# Patient Record
Sex: MALE | Race: BLACK OR AFRICAN AMERICAN | NOT HISPANIC OR LATINO
[De-identification: names, ages, dates, MRNs, and addresses within clinical notes are randomized per-mention and may not be internally consistent; named-entity substitution may affect disease eponyms.]

---

## 2018-04-19 ENCOUNTER — APPOINTMENT (OUTPATIENT)
Dept: ORTHOPEDIC SURGERY | Facility: CLINIC | Age: 63
End: 2018-04-19
Payer: COMMERCIAL

## 2018-04-19 DIAGNOSIS — M75.41 IMPINGEMENT SYNDROME OF RIGHT SHOULDER: ICD-10-CM

## 2018-04-19 PROCEDURE — 99214 OFFICE O/P EST MOD 30 MIN: CPT | Mod: 25

## 2018-04-19 PROCEDURE — 73030 X-RAY EXAM OF SHOULDER: CPT | Mod: RT

## 2018-04-19 PROCEDURE — 20610 DRAIN/INJ JOINT/BURSA W/O US: CPT | Mod: RT

## 2019-01-03 ENCOUNTER — APPOINTMENT (OUTPATIENT)
Dept: ORTHOPEDIC SURGERY | Facility: CLINIC | Age: 64
End: 2019-01-03

## 2019-01-17 ENCOUNTER — APPOINTMENT (OUTPATIENT)
Dept: ORTHOPEDIC SURGERY | Facility: CLINIC | Age: 64
End: 2019-01-17
Payer: COMMERCIAL

## 2019-01-17 PROCEDURE — 73564 X-RAY EXAM KNEE 4 OR MORE: CPT | Mod: RT

## 2019-01-17 PROCEDURE — 99214 OFFICE O/P EST MOD 30 MIN: CPT | Mod: 25

## 2019-01-17 PROCEDURE — 20610 DRAIN/INJ JOINT/BURSA W/O US: CPT | Mod: RT

## 2019-01-17 NOTE — PHYSICAL EXAM
[de-identified] : Oriented to time, place, person\par Mood: Normal\par Affect: Normal\par \par Right knee exam\par \par Skin: Clean, dry, intact\par Inspection: No obvious malalignment, no masses, mild swelling, no effusion\par Pulses: 2+ DP/PT pulses\par ROM: 0-120 degrees of flexion. Anterior pain with deep knee flexion/extension.\par Tenderness: No MJLT. No LJLT. Positive pain over the patella facets. No pain to the quadriceps tendon. Mild pain to the patella tendon. No posterior knee tenderness.\par Stability: Stable to varus, valgus. Negative lachman testing. Negative anterior drawer, negative posterior drawer.\par Strength: 5/5 Q/H/TA/GS/EHL, without atrophy\par Neuro: In tact to light touch throughout, DTR's normal\par Additional tests: Negative McMurrays test, mild patellar grind test. \par \par  [de-identified] : \par The following radiographs were ordered and read by me during this patients visit. I reviewed each radiograph in detail with the patient and discussed the findings as highlighted below. \par \par 4 views of the right knee were obtained today that show no acute fracture or dislocation. There is moderate medial, moderate lateral and moderate patellofemoral degenerative change seen. There is no significant malalignment. No significant other obvious osseous abnormality, otherwise unremarkable. Patella robert. Patella tendinopathy.

## 2019-01-17 NOTE — DISCUSSION/SUMMARY
[de-identified] : 63-year-old male with right knee osteoarthritis\par \par Patient presents today for evaluation right knee pain following remote patella tendon repair. Patient has some patella tendinopathy, with residual arthrosis within all compartments. Recent exacerbation with mild injury.\par \par I discussed the treatment of degenerative arthritis with the patient at length today, as well as the chronic degenerative process and likely progression of the disease. I described the spectrum of treatment from nonoperative modalities to total joint arthroplasty. Noninvasive and nonoperative treatment modalities include weight reduction, activity modification with low impact exercise, PRN use of acetaminophen or anti-inflammatory medication if tolerated, glucosamine/chondroitin supplements, and physical therapy. Further treatments can include corticosteroid injection and the use of hyaluronic acid injections. Definitive treatment would include total joint arthroplasty. \par \par The risks and benefits of each treatment options was discussed and all questions were answered.\par \par Current recommendations: Cortisone injection provided today for symptomatic relief. Conservative/symptomatic supportive care as above\par \par Followup p.r.n.

## 2019-01-17 NOTE — HISTORY OF PRESENT ILLNESS
[de-identified] : 63 year old male presents today with right knee pain since October 2018. Pain started after he missed a step on a ladder in October. Remote history of bilateral patella tendon repairs. The pain is intermittent brought pain with driving. Motrin is mildly helpful. Pain is localized to the medial aspect of the knee. Reports stiffness in the knees.

## 2019-01-17 NOTE — PROCEDURE
[de-identified] : Injection: Right knee joint.\par Indication: Osteoarthritis.\par \par A discussion was had with the patient regarding this procedure and all questions were answered. All risks, benefits and alternatives were discussed. These included but were not limited to bleeding, infection, and allergic reaction. Alcohol was used to clean the skin, and betadine was used to sterilize and prep the area in the supero-lateral aspect of the right knee. Ethyl chloride spray was then used as a topical anesthetic. A 21-gauge needle was used to inject 4cc of 1% lidocaine and 1cc of 40mg/ml methylprednisolone into the knee. A sterile bandage was then applied. The patient tolerated the procedure well and there were no complications.

## 2019-11-09 ENCOUNTER — INPATIENT (INPATIENT)
Facility: HOSPITAL | Age: 64
LOS: 2 days | Discharge: HOME | DRG: 62 | End: 2019-11-12
Attending: INTERNAL MEDICINE | Admitting: PSYCHIATRY & NEUROLOGY
Payer: COMMERCIAL

## 2019-11-09 VITALS — HEART RATE: 30 BPM | RESPIRATION RATE: 14 BRPM

## 2019-11-09 DIAGNOSIS — Z98.89 OTHER SPECIFIED POSTPROCEDURAL STATES: Chronic | ICD-10-CM

## 2019-11-09 DIAGNOSIS — I63.9 CEREBRAL INFARCTION, UNSPECIFIED: ICD-10-CM

## 2019-11-09 LAB
ALBUMIN SERPL ELPH-MCNC: 4.6 G/DL — SIGNIFICANT CHANGE UP (ref 3.3–5)
ALP SERPL-CCNC: 64 U/L — SIGNIFICANT CHANGE UP (ref 40–120)
ALT FLD-CCNC: 13 U/L — SIGNIFICANT CHANGE UP (ref 10–45)
ANION GAP SERPL CALC-SCNC: 14 MMOL/L — SIGNIFICANT CHANGE UP (ref 5–17)
APTT BLD: 28.1 SEC — SIGNIFICANT CHANGE UP (ref 27.5–36.3)
AST SERPL-CCNC: 20 U/L — SIGNIFICANT CHANGE UP (ref 10–40)
BASOPHILS # BLD AUTO: 0.02 K/UL — SIGNIFICANT CHANGE UP (ref 0–0.2)
BASOPHILS NFR BLD AUTO: 0.1 % — SIGNIFICANT CHANGE UP (ref 0–2)
BILIRUB SERPL-MCNC: 0.5 MG/DL — SIGNIFICANT CHANGE UP (ref 0.2–1.2)
BUN SERPL-MCNC: 13 MG/DL — SIGNIFICANT CHANGE UP (ref 7–23)
CALCIUM SERPL-MCNC: 9.1 MG/DL — SIGNIFICANT CHANGE UP (ref 8.4–10.5)
CHLORIDE SERPL-SCNC: 106 MMOL/L — SIGNIFICANT CHANGE UP (ref 96–108)
CK MB CFR SERPL CALC: 2.1 NG/ML — SIGNIFICANT CHANGE UP (ref 0–6.7)
CO2 SERPL-SCNC: 24 MMOL/L — SIGNIFICANT CHANGE UP (ref 22–31)
CREAT SERPL-MCNC: 1 MG/DL — SIGNIFICANT CHANGE UP (ref 0.5–1.3)
EOSINOPHIL # BLD AUTO: 0.17 K/UL — SIGNIFICANT CHANGE UP (ref 0–0.5)
EOSINOPHIL NFR BLD AUTO: 1.3 % — SIGNIFICANT CHANGE UP (ref 0–6)
GLUCOSE SERPL-MCNC: 172 MG/DL — HIGH (ref 70–99)
HCT VFR BLD CALC: 43.1 % — SIGNIFICANT CHANGE UP (ref 39–50)
HGB BLD-MCNC: 14.2 G/DL — SIGNIFICANT CHANGE UP (ref 13–17)
IMM GRANULOCYTES NFR BLD AUTO: 0.4 % — SIGNIFICANT CHANGE UP (ref 0–1.5)
INR BLD: 1.03 RATIO — SIGNIFICANT CHANGE UP (ref 0.88–1.16)
LYMPHOCYTES # BLD AUTO: 22.7 % — SIGNIFICANT CHANGE UP (ref 13–44)
LYMPHOCYTES # BLD AUTO: 3.08 K/UL — SIGNIFICANT CHANGE UP (ref 1–3.3)
MCHC RBC-ENTMCNC: 27 PG — SIGNIFICANT CHANGE UP (ref 27–34)
MCHC RBC-ENTMCNC: 32.9 GM/DL — SIGNIFICANT CHANGE UP (ref 32–36)
MCV RBC AUTO: 81.9 FL — SIGNIFICANT CHANGE UP (ref 80–100)
MONOCYTES # BLD AUTO: 0.7 K/UL — SIGNIFICANT CHANGE UP (ref 0–0.9)
MONOCYTES NFR BLD AUTO: 5.2 % — SIGNIFICANT CHANGE UP (ref 2–14)
NEUTROPHILS # BLD AUTO: 9.55 K/UL — HIGH (ref 1.8–7.4)
NEUTROPHILS NFR BLD AUTO: 70.3 % — SIGNIFICANT CHANGE UP (ref 43–77)
NRBC # BLD: 0 /100 WBCS — SIGNIFICANT CHANGE UP (ref 0–0)
PLATELET # BLD AUTO: 251 K/UL — SIGNIFICANT CHANGE UP (ref 150–400)
POTASSIUM SERPL-MCNC: 3.7 MMOL/L — SIGNIFICANT CHANGE UP (ref 3.5–5.3)
POTASSIUM SERPL-SCNC: 3.7 MMOL/L — SIGNIFICANT CHANGE UP (ref 3.5–5.3)
PROT SERPL-MCNC: 7.6 G/DL — SIGNIFICANT CHANGE UP (ref 6–8.3)
PROTHROM AB SERPL-ACNC: 11.8 SEC — SIGNIFICANT CHANGE UP (ref 10–12.9)
RBC # BLD: 5.26 M/UL — SIGNIFICANT CHANGE UP (ref 4.2–5.8)
RBC # FLD: 13.3 % — SIGNIFICANT CHANGE UP (ref 10.3–14.5)
SODIUM SERPL-SCNC: 144 MMOL/L — SIGNIFICANT CHANGE UP (ref 135–145)
TROPONIN T, HIGH SENSITIVITY RESULT: 6 NG/L — SIGNIFICANT CHANGE UP (ref 0–51)
WBC # BLD: 13.58 K/UL — HIGH (ref 3.8–10.5)
WBC # FLD AUTO: 13.58 K/UL — HIGH (ref 3.8–10.5)

## 2019-11-09 PROCEDURE — 0042T: CPT

## 2019-11-09 PROCEDURE — 93010 ELECTROCARDIOGRAM REPORT: CPT

## 2019-11-09 PROCEDURE — 99291 CRITICAL CARE FIRST HOUR: CPT

## 2019-11-09 PROCEDURE — 70496 CT ANGIOGRAPHY HEAD: CPT | Mod: 26

## 2019-11-09 PROCEDURE — 71045 X-RAY EXAM CHEST 1 VIEW: CPT | Mod: 26

## 2019-11-09 PROCEDURE — 70498 CT ANGIOGRAPHY NECK: CPT | Mod: 26

## 2019-11-09 RX ORDER — ALTEPLASE 100 MG
9 KIT INTRAVENOUS ONCE
Refills: 0 | Status: COMPLETED | OUTPATIENT
Start: 2019-11-09 | End: 2019-11-09

## 2019-11-09 RX ORDER — SODIUM CHLORIDE 9 MG/ML
1000 INJECTION INTRAMUSCULAR; INTRAVENOUS; SUBCUTANEOUS
Refills: 0 | Status: DISCONTINUED | OUTPATIENT
Start: 2019-11-09 | End: 2019-11-09

## 2019-11-09 RX ORDER — ALTEPLASE 100 MG
81 KIT INTRAVENOUS ONCE
Refills: 0 | Status: COMPLETED | OUTPATIENT
Start: 2019-11-09 | End: 2019-11-09

## 2019-11-09 RX ORDER — ACETAMINOPHEN 500 MG
650 TABLET ORAL EVERY 6 HOURS
Refills: 0 | Status: DISCONTINUED | OUTPATIENT
Start: 2019-11-09 | End: 2019-11-12

## 2019-11-09 RX ORDER — ATROPINE SULFATE 0.1 MG/ML
0.5 SYRINGE (ML) INJECTION ONCE
Refills: 0 | Status: DISCONTINUED | OUTPATIENT
Start: 2019-11-09 | End: 2019-11-12

## 2019-11-09 RX ORDER — ATORVASTATIN CALCIUM 80 MG/1
80 TABLET, FILM COATED ORAL AT BEDTIME
Refills: 0 | Status: DISCONTINUED | OUTPATIENT
Start: 2019-11-09 | End: 2019-11-12

## 2019-11-09 RX ORDER — ONDANSETRON 8 MG/1
4 TABLET, FILM COATED ORAL ONCE
Refills: 0 | Status: COMPLETED | OUTPATIENT
Start: 2019-11-09 | End: 2019-11-09

## 2019-11-09 RX ORDER — MECLIZINE HCL 12.5 MG
25 TABLET ORAL ONCE
Refills: 0 | Status: DISCONTINUED | OUTPATIENT
Start: 2019-11-09 | End: 2019-11-09

## 2019-11-09 RX ORDER — SODIUM CHLORIDE 9 MG/ML
1000 INJECTION INTRAMUSCULAR; INTRAVENOUS; SUBCUTANEOUS ONCE
Refills: 0 | Status: COMPLETED | OUTPATIENT
Start: 2019-11-09 | End: 2019-11-09

## 2019-11-09 RX ADMIN — ONDANSETRON 4 MILLIGRAM(S): 8 TABLET, FILM COATED ORAL at 11:33

## 2019-11-09 RX ADMIN — ALTEPLASE 540 MILLIGRAM(S): KIT at 12:19

## 2019-11-09 RX ADMIN — SODIUM CHLORIDE 1000 MILLILITER(S): 9 INJECTION INTRAMUSCULAR; INTRAVENOUS; SUBCUTANEOUS at 12:00

## 2019-11-09 RX ADMIN — ATORVASTATIN CALCIUM 80 MILLIGRAM(S): 80 TABLET, FILM COATED ORAL at 23:39

## 2019-11-09 RX ADMIN — ALTEPLASE 81 MILLIGRAM(S): KIT at 12:20

## 2019-11-09 RX ADMIN — Medication 650 MILLIGRAM(S): at 23:40

## 2019-11-09 NOTE — ED ADULT NURSE NOTE - CHPI ED NUR SYMPTOMS POS
Pain, swelling with abrasions to top of left foot, lower back pains with headache  s/p falling from top stairs since yesterday morning, today c/o feeling dizzy today.
DIZZINESS/NAUSEA

## 2019-11-09 NOTE — ED ADULT NURSE NOTE - NSIMPLEMENTINTERV_GEN_ALL_ED
Implemented All Fall with Harm Risk Interventions:  [place] to call system. Call bell, personal items and telephone within reach. Instruct patient to call for assistance. Room bathroom lighting operational. Non-slip footwear when patient is off stretcher. Physically safe environment: no spills, clutter or unnecessary equipment. Stretcher in lowest position, wheels locked, appropriate side rails in place. Provide visual cue, wrist band, yellow gown, etc. Monitor gait and stability. Monitor for mental status changes and reorient to person, place, and time. Review medications for side effects contributing to fall risk. Reinforce activity limits and safety measures with patient and family. Provide visual clues: red socks.

## 2019-11-09 NOTE — PHYSICAL THERAPY INITIAL EVALUATION ADULT - PRECAUTIONS/LIMITATIONS, REHAB EVAL
CT BRAIN:  No acute intracranial hemorrhage, mass effect, or CT evidence of an acute or recent subacute transcortical infarct.CTA NECK:  No significant stenosis of the cervical carotid or vertebral arteries.CTA HEAD:  No significant stenosis or occlusion of the major proximal branches of the [place].CT PERFUSION: No core infarct is identified. fall precautions/CT BRAIN:  No acute intracranial hemorrhage, mass effect, or CT evidence of an acute or recent subacute transcortical infarct.CTA NECK:  No significant stenosis of the cervical carotid or vertebral arteries.CTA HEAD:  No significant stenosis or occlusion of the major proximal branches of the [place].CT PERFUSION: No core infarct is identified.

## 2019-11-09 NOTE — OCCUPATIONAL THERAPY INITIAL EVALUATION ADULT - PERTINENT HX OF CURRENT PROBLEM, REHAB EVAL
63yo man no pmh not on AC presenting with sudden onset dizziness, nausea, vomiting came in as code stroke with LWK 11/9/19 9a when patient felt sudden onset unrelated to position dizziness and fell, not hitting his head nor losing consciousness. S/p tpa administered at 11:56 am on 11/9. SEE BELOW.

## 2019-11-09 NOTE — PHYSICAL THERAPY INITIAL EVALUATION ADULT - PREDICTED DURATION OF THERAPY (DAYS/WKS), PT EVAL
Pt is cleared from inpatient PT services at this time, ambulating independently please re-consult if appropriate

## 2019-11-09 NOTE — ED PROVIDER NOTE - CRANIAL NERVE AND PUPILLARY EXAM
has difficulty identifying objects in the right peripheral field/cranial nerves 2-12 intact/PERIPHERAL VISION NOT INTACT/extra-ocular movements intact/tongue is midline

## 2019-11-09 NOTE — ED PROVIDER NOTE - ATTENDING CONTRIBUTION TO CARE
****ATTENDING**** 65yo male hx bradycardia BIB EMS from  for dizziness and low HR. Pt states he was driving to the gym when he had sudden onset of dizziness, n/v. Patient drove to  and EKG revealed bradycardia. Pt denies HA, chest pain, palp, no recent illness and was in good health until symptoms. + dizziness w room spinning.   On exam, patient is awake and alert x3, +nauseous and vomiting. R sided nystagmus. PERRL, EOMI, VF intact, Patient's chest is clear to ausculation, +s1s2. Abdomen is soft nd/nt +BS. Extremity with no swelling or calf tenderness. 5/5 UE/LE nml sensation and coordination.  Code stroke called. EKG reviewed. Check labs, CT to eval for CVA.

## 2019-11-09 NOTE — CONSULT NOTE ADULT - ASSESSMENT
63yo man no pmh not on AC presenting with sudden onset dizziness, nausea, vomiting came in as code stroke with LWK 11/9/19 9a when patient felt sudden onset unrelated to position dizziness and fell, not hitting his head nor losing consciousness. (09 Nov 2019 14:05)  pt was found bradycardia at the time 39-45 bpm.  pt denies of any hx of dizziness/ syncopal episodes.  pt with acute cva  bradycardia with sig bifascicular block suspect conduction disease   observe closely on tele  tsh  will consider echo  no av blocking agents  increase bp will add ace inhibitor

## 2019-11-09 NOTE — ED ADULT NURSE NOTE - OBJECTIVE STATEMENT
64 year old male a/ox3 with no significant PMH presenting via EMS from [facility] for acute onset of dizziness and nausea while driving this morning. patient states he was on the way to gym this morning and suddenly became very dizzy. drove to urgent care, had EKG done which showed sinus bradycardia. patient was given 4mg Zofran Po and 911 called. upon arrival patient was very nauseous and dizzy. upon assessment patient noted to have bilateral horizontal nystagmus, worse on right side. code stroke initiated FS obtained and patient sent to CT scan where neurology resident arrived. TpA bolus given by toñito resident at 1219 and infusion initiated at 1220. 2 PIV placed prior to code stroke.

## 2019-11-09 NOTE — CONSULT NOTE ADULT - SUBJECTIVE AND OBJECTIVE BOX
CHIEF Complaint: patient is a 64y old  Male who presents with a chief complaint of s/p tpa (09 Nov 2019 14:05)      HPI:  65yo man no pmh not on AC presenting with sudden onset dizziness, nausea, vomiting came in as code stroke with LWK 11/9/19 9a when patient felt sudden onset unrelated to position dizziness and fell, not hitting his head nor losing consciousness. (09 Nov 2019 14:05)  pt was found bradycardia at the time 39-45 bpm.  pt denies of any hx of dizziness/ syncopal episodes.        PAST MEDICAL & SURGICAL HISTORY:  No pertinent past medical history  S/P tendon repair: left knee 1995  right knee 1985      MEDICATIONS  (STANDING):  atropine Injectable 0.5 milliGRAM(s) IntraMuscular once    MEDICATIONS  (PRN):      FAMILY HISTORY:  No pertinent family history in first degree relatives      SOCIAL HISTORY:    [ ] Non-smoker  [ ] Smoker  [ ] Alcohol    Allergies    No Known Allergies    Intolerances    	    REVIEW OF SYSTEMS:  CONSTITUTIONAL: No fever, weight loss, or fatigue  EYES: No eye pain, visual disturbances, or discharge  ENT:  No difficulty hearing, tinnitus, vertigo; No sinus or throat pain  NECK: No pain or stiffness  RESPIRATORY: No cough, wheezing, chills or hemoptysis; No Shortness of Breath  CARDIOVASCULAR: No chest pain, palpitations, passing out, dizziness, or leg swelling  GASTROINTESTINAL: No abdominal or epigastric pain. No nausea, vomiting, or hematemesis; No diarrhea or constipation. No melena or hematochezia.  GENITOURINARY: No dysuria, frequency, hematuria, or incontinence  NEUROLOGICAL: No headaches, memory loss, loss of strength, numbness, or tremors, +dizziness  SKIN: No itching, burning, rashes, or lesions   LYMPH Nodes: No enlarged glands  ENDOCRINE: No heat or cold intolerance; No hair loss  MUSCULOSKELETAL: No joint pain or swelling; No muscle, back, or extremity pain  PSYCHIATRIC: No depression, anxiety, mood swings, or difficulty sleeping  HEME/LYMPH: No easy bruising, or bleeding gums  ALLERGY AND IMMUNOLOGIC: No hives or eczema	    [ ] All others negative	  [ ] Unable to obtain    PHYSICAL EXAM:  T(C): 36.5 (11-09-19 @ 14:00), Max: 36.5 (11-09-19 @ 14:00)  HR: 38 (11-09-19 @ 14:30) (30 - 57)  BP: 144/76 (11-09-19 @ 14:30) (115/66 - 144/84)  RR: 17 (11-09-19 @ 14:30) (14 - 20)  SpO2: 98% (11-09-19 @ 14:30) (97% - 100%)  Wt(kg): --  I&O's Summary      Appearance: Normal	  HEENT:   Normal oral mucosa, PERRL, EOMI	  Lymphatic: No lymphadenopathy  Cardiovascular: Normal S1 S2, No JVD, No murmurs, No edema  Respiratory: Lungs clear to auscultation	  Psychiatry: A & O x 3, Mood & affect appropriate  Gastrointestinal:  Soft, Non-tender, + BS	  Skin: No rashes, No ecchymoses, No cyanosis	  Neurologic: Non-focal  Extremities: Normal range of motion, No clubbing, cyanosis or edema  Vascular: Peripheral pulses palpable 2+ bilaterally    TELEMETRY: 	    ECG:  	  RADIOLOGY:  OTHER: 	  	  LABS:	 	    CARDIAC MARKERS:  CARDIAC MARKERS ( 09 Nov 2019 11:46 )  x     / x     / x     / x     / 2.1 ng/mL                              14.2   13.58 )-----------( 251      ( 09 Nov 2019 11:46 )             43.1     11-09    144  |  106  |  13  ----------------------------<  172<H>  3.7   |  24  |  1.00    Ca    9.1      09 Nov 2019 11:46  Phos  3.5     11-09  Mg     1.9     11-09    TPro  7.6  /  Alb  4.6  /  TBili  0.5  /  DBili  x   /  AST  20  /  ALT  13  /  AlkPhos  64  11-09    proBNP:   Lipid Profile:   HgA1c:   TSH:   PT/INR - ( 09 Nov 2019 11:46 )   PT: 11.8 sec;   INR: 1.03 ratio         PTT - ( 09 Nov 2019 11:46 )  PTT:28.1 sec    PREVIOUS DIAGNOSTIC TESTING:    < from: 12 Lead ECG (11.09.19 @ 11:37) >  Diagnosis Line SINUS BRADYCARDIA  RIGHT BUNDLE BRANCH BLOCK  LEFT ANTERIOR FASCICULAR BLOCK  *** BIFASCICULAR BLOCK ***  VOLTAGE CRITERIA FOR LEFT VENTRICULAR HYPERTROPHY  T WAVE ABNORMALITY, CONSIDER INFEROLATERAL ISCHEMIA  ABNORMAL ECG    < from: CT Angio Head w/ IV Cont (11.09.19 @ 12:12) >  CT BRAIN:  No acute intracranial hemorrhage, mass effect, or CT evidence   of an acute or recent subacute transcortical infarct.    CTA NECK:  No significant stenosis of the cervical carotid or vertebral   arteries.    CTA HEAD:  No significant stenosis or occlusion of the major proximal   branches of the [place].    CT PERFUSION: No core infarct is identified.    < from: Xray Chest 1 View- PORTABLE-Urgent (11.09.19 @ 13:06) >  IMPRESSION: Clear lungs.

## 2019-11-09 NOTE — ED PROVIDER NOTE - CLINICAL SUMMARY MEDICAL DECISION MAKING FREE TEXT BOX
Pt p/w bradycardia from urgent care facility, en route to the gym this morning developed sudden onset dizziness associated with vomiting. Here HR in the 40s without block (runs low at baseline per wife), no active cp or sob, persistently dizzy with visible nystagmus and wide based gait with ambulation, could not fully trial ambulation 2/2 dizziness, will be admitted to stroke unit after TPA  Kasey Bob, PGY-3 EM

## 2019-11-09 NOTE — H&P ADULT - ATTENDING COMMENTS
Mr. Lord is a 63yo man no significant past medical history ,not on AC presenting with sudden onset dizziness, nausea, vomiting came in as code stroke with LWK 11/9/19 9a when patient felt sudden onset unrelated to position dizziness and fell, not hitting his head nor losing consciousness. Neurological examination demonstrated non-fatiguable b/l endgaze nystagmus and instability in gait, no evidence of lateral portion or cranial nerve deficits such as dysphagia dysarthria or disconjugate eye movements. it appears there was a strong suspicion for posterior circulation ischemia given his gait, therefore considering disabling deficit he was treated with IV thrombolysis  Impression: ? Posterior circulation TIA/ischemia  MRI brain today, following which he can be started on aspirin and DVT prophylaxis  Plan:   - Risks, benefits and adverse reactions associated with IV tPA including hemorrhagic stroke were discussed with patient   - Cont with IV tPA precautions including BP goal<185/110 mmHg before the bolus  - No antiplatelets or anticoagulants at this time, Aspirin to be considered 24 hours after the IV tPA and repeat brain imaging  - DVT prophylaxis with s/c heparin to be considered in 24 hours from IV tPA after brain imaging  - Consider Atorvastatin 80 mg after establishing enteral access or passing swallow eval  - Allow permissive HTN up to 180/105 mmHg   - Cont with IV hydration  - TTE with bubble study and telemetry to look for the cardiac source of embolism  - LDL and HbA1C   - Swallow eval  - PT/OT/Speech eval once medically stable    Above mentioned plan was discussed with patient,

## 2019-11-09 NOTE — H&P ADULT - NSHPREVIEWOFSYSTEMS_GEN_ALL_CORE
denies shortness of breath, fevers, chills, chest pain, endorses nauseousness, vomiting & dizziness, denies recent trauma

## 2019-11-09 NOTE — H&P ADULT - HISTORY OF PRESENT ILLNESS
65yo man no pmh not on AC presenting with sudden onset dizziness, nausea, vomiting came in as code stroke with LWK 11/9/19 9a when patient felt sudden onset unrelated to position dizziness and fell, not hitting his head nor losing consciousness. 65yo man no pmh not on AC presenting with sudden onset dizziness, nausea, vomiting came in as code stroke with LWK 11/9/19 9a when patient felt sudden onset unrelated to position dizziness and fell, not hitting his head nor losing consciousness. He endorsed associated nauseousness and 2 episodes of vomiting with the dizziness. Denied headaches, changes in vision, hearing changes, symptoms occurring upon awakening, symptoms occurring prior to today, recent travel.    In ED, patient was noted to have sinus bradycardia into 30s-40s, wife at bedside noted that patient at baseline has a low heart rate but denies it ever being as low as 30s-40s.     NIHSS 0  mRS 4    Decision made to give tPA given patient nonfatiguable nystagmus upon b/l end gaze and upward gaze and inability to walk. Patient, wife, Stroke & ED Attendings made aware of benefits and risks of tPA and in current scenario deemed benefits outweigh risks of administering tPA.   tPA bolus administered 12:19p 11/9/19.

## 2019-11-09 NOTE — H&P ADULT - ASSESSMENT
Plan:  - goal BP < 180/110  - Neuro checks q2hr in Stroke Unit  - NPO x 24hrs  - Dysphagia screen in 12-24hrs    Imaging/Studies  - MRI brain w/o cont  - MRA brain w/o cont   - MRA neck w/ cont  - Repeat Head CT or MRI in 24hrs evaluate for hemorrhagic conversion or get CTH earlier for change in mental status  - TTE   - Telemetry monitoring   - PT/ OT  - Speech and Swallow Evaluation    Labs  - HgA1c  - Lipid profile    Meds  - ASA 81mg after 24hrs if no significnat bleeding on repeat imaging  - Atorvastatin 80mg QHS    d/w Stroke Attending Dr. Hernandez Assessment: 65yo man no pmh not on AC presenting with sudden onset dizziness, nausea, vomiting came in as code stroke with LWK 11/9/19 9a when patient felt sudden onset unrelated to position dizziness and fell, not hitting his head nor losing consciousness. Neurological examination demonstrated non-fatiguable b/l endgaze nystagmus and instability in gait. Decision to give tPA made and patient given at 12:19p. Patient not candidate for mechanical thrombectomy given no large vessel occlusion.     Impression: non-fatiguable nystagmus and gait instability 2/2 likely acute ischemic infarct posterior fossa 2/2 unknown mechanism s/p tPA    Plan:  - admit to stroke unit  - goal BP < 180/110  - Neuro checks q2hr in Stroke Unit  - NPO x 24hrs  - Dysphagia screen in 12-24hrs    Imaging/Studies  - MRI brain w/o cont  - MRA brain w/o cont   - MRA neck w/ cont  - Repeat Head CT or MRI in 24hrs evaluate for hemorrhagic conversion or get CTH earlier for change in mental status  - TTE   - Telemetry monitoring   - PT/ OT  - Speech and Swallow Evaluation  - keep pads on for sinus bradycardia    Labs  - HgA1c  - Lipid profile    Meds  - ASA 81mg after 24hrs if no significnat bleeding on repeat imaging  - Atorvastatin 80mg QHS    Consult  - Cardiology for sinus bradycardia in the setting of concern for stroke    d/w Stroke Attending Dr. Hernandez

## 2019-11-09 NOTE — OCCUPATIONAL THERAPY INITIAL EVALUATION ADULT - ADDITIONAL COMMENTS
CONTINUED. 11/9 CT BRAIN:  No acute intracranial hemorrhage, mass effect, or CT evidence of an acute or recent subacute transcortical infarct. CTA NECK:  No significant stenosis of the cervical carotid or vertebral arteries. CTA HEAD:  No significant stenosis or occlusion of the major proximal branches of the [place] of [place]. CT PERFUSION: No core infarct is identified.

## 2019-11-09 NOTE — PHYSICAL THERAPY INITIAL EVALUATION ADULT - GENERAL OBSERVATIONS, REHAB EVAL
Pt rec'd sitting in bedside chair in NAD, VSS, +tele monitoring, +pulse ox monitoring, IVL agreeable to PT session

## 2019-11-09 NOTE — H&P ADULT - NSHPPHYSICALEXAM_GEN_ALL_CORE
MS: awake, alert, oriented to self, location, date, intact to naming, repetition, comprehension  CN: II - XII: PERRL, EOMI with demonstration of non-fatiguable nystagmus upon bilateral end gaze with horizontal nystagmus noted upon upward gaze, VFF, facial sensation intact, no facial asymmetry, no hearing loss, soft palate and uvula elevate without deviation, tongue protrudes midline, shoulder shrug intact  Motor: normal bulk, tone, 5/5 strength in upper and lower extremities b/l with no downward nor pronator drift in any extremity  Sensation: intact to light touch b/l upper and lower extremities  Cerebellar: no dysmetria appreciated upon FTN  Gait: patient able to get up but then almost falls when takes one step forward

## 2019-11-09 NOTE — ED ADULT NURSE NOTE - CHPI ED NUR SYMPTOMS NEG
no vomiting/no blurred vision/no confusion/no loss of consciousness/no weakness/no fever/no numbness/no change in level of consciousness

## 2019-11-09 NOTE — H&P ADULT - NSHPLABSRESULTS_GEN_ALL_CORE
14.2   13.58 )-----------( 251      ( 09 Nov 2019 11:46 )             43.1   11-09    144  |  106  |  13  ----------------------------<  172<H>  3.7   |  24  |  1.00    Ca    9.1      09 Nov 2019 11:46  Phos  3.5     11-09  Mg     1.9     11-09    TPro  7.6  /  Alb  4.6  /  TBili  0.5  /  DBili  x   /  AST  20  /  ALT  13  /  AlkPhos  64  11-09    < from: CT Angio Head w/ IV Cont (11.09.19 @ 12:12) >    IMPRESSION:    CT BRAIN:  No acute intracranial hemorrhage, mass effect, or CT evidence   of an acute or recent subacute transcortical infarct.    CTA NECK:  No significant stenosis of the cervical carotid or vertebral   arteries.    CTA HEAD:  No significant stenosis or occlusion of the major proximal   branches of the [place].    CT PERFUSION: No core infarct is identified.    < end of copied text >

## 2019-11-10 ENCOUNTER — TRANSCRIPTION ENCOUNTER (OUTPATIENT)
Age: 64
End: 2019-11-10

## 2019-11-10 LAB
ANION GAP SERPL CALC-SCNC: 12 MMOL/L — SIGNIFICANT CHANGE UP (ref 5–17)
BUN SERPL-MCNC: 13 MG/DL — SIGNIFICANT CHANGE UP (ref 7–23)
CALCIUM SERPL-MCNC: 9.3 MG/DL — SIGNIFICANT CHANGE UP (ref 8.4–10.5)
CHLORIDE SERPL-SCNC: 103 MMOL/L — SIGNIFICANT CHANGE UP (ref 96–108)
CHOLEST SERPL-MCNC: 207 MG/DL — HIGH (ref 10–199)
CO2 SERPL-SCNC: 24 MMOL/L — SIGNIFICANT CHANGE UP (ref 22–31)
CREAT SERPL-MCNC: 1.06 MG/DL — SIGNIFICANT CHANGE UP (ref 0.5–1.3)
GLUCOSE SERPL-MCNC: 111 MG/DL — HIGH (ref 70–99)
HBA1C BLD-MCNC: 5.8 % — HIGH (ref 4–5.6)
HCT VFR BLD CALC: 42.1 % — SIGNIFICANT CHANGE UP (ref 39–50)
HCV AB S/CO SERPL IA: 0.12 S/CO — SIGNIFICANT CHANGE UP (ref 0–0.99)
HCV AB SERPL-IMP: SIGNIFICANT CHANGE UP
HDLC SERPL-MCNC: 59 MG/DL — SIGNIFICANT CHANGE UP
HGB BLD-MCNC: 14.1 G/DL — SIGNIFICANT CHANGE UP (ref 13–17)
LIPID PNL WITH DIRECT LDL SERPL: 129 MG/DL — HIGH
MCHC RBC-ENTMCNC: 27.4 PG — SIGNIFICANT CHANGE UP (ref 27–34)
MCHC RBC-ENTMCNC: 33.5 GM/DL — SIGNIFICANT CHANGE UP (ref 32–36)
MCV RBC AUTO: 81.7 FL — SIGNIFICANT CHANGE UP (ref 80–100)
NRBC # BLD: 0 /100 WBCS — SIGNIFICANT CHANGE UP (ref 0–0)
PLATELET # BLD AUTO: 224 K/UL — SIGNIFICANT CHANGE UP (ref 150–400)
POTASSIUM SERPL-MCNC: 3.9 MMOL/L — SIGNIFICANT CHANGE UP (ref 3.5–5.3)
POTASSIUM SERPL-SCNC: 3.9 MMOL/L — SIGNIFICANT CHANGE UP (ref 3.5–5.3)
RBC # BLD: 5.15 M/UL — SIGNIFICANT CHANGE UP (ref 4.2–5.8)
RBC # FLD: 13.6 % — SIGNIFICANT CHANGE UP (ref 10.3–14.5)
SODIUM SERPL-SCNC: 139 MMOL/L — SIGNIFICANT CHANGE UP (ref 135–145)
TOTAL CHOLESTEROL/HDL RATIO MEASUREMENT: 3.5 RATIO — SIGNIFICANT CHANGE UP (ref 3.4–9.6)
TRIGL SERPL-MCNC: 97 MG/DL — SIGNIFICANT CHANGE UP (ref 10–149)
WBC # BLD: 10.34 K/UL — SIGNIFICANT CHANGE UP (ref 3.8–10.5)
WBC # FLD AUTO: 10.34 K/UL — SIGNIFICANT CHANGE UP (ref 3.8–10.5)

## 2019-11-10 PROCEDURE — 99291 CRITICAL CARE FIRST HOUR: CPT

## 2019-11-10 PROCEDURE — 70551 MRI BRAIN STEM W/O DYE: CPT | Mod: 26

## 2019-11-10 RX ORDER — ATORVASTATIN CALCIUM 80 MG/1
1 TABLET, FILM COATED ORAL
Qty: 30 | Refills: 0
Start: 2019-11-10

## 2019-11-10 RX ORDER — ASPIRIN/CALCIUM CARB/MAGNESIUM 324 MG
1 TABLET ORAL
Qty: 30 | Refills: 0
Start: 2019-11-10 | End: 2019-12-09

## 2019-11-10 RX ADMIN — Medication 650 MILLIGRAM(S): at 00:10

## 2019-11-10 RX ADMIN — ATORVASTATIN CALCIUM 80 MILLIGRAM(S): 80 TABLET, FILM COATED ORAL at 21:06

## 2019-11-10 NOTE — SPEECH LANGUAGE PATHOLOGY EVALUATION - SLP DIAGNOSIS
Chart reviewed. Case d/w GEOVANI Perla. As per d/w PA, speech-language evaluation not currently indicated. This service will sign-off. Chart reviewed. Case d/w GEOVANI Perla. As per d/w PA, speech-language evaluation not currently indicated and exam to be discontinued. This service will sign-off.

## 2019-11-10 NOTE — SPEECH LANGUAGE PATHOLOGY EVALUATION - SLP PERTINENT HISTORY OF CURRENT PROBLEM
65yo man no pmh not on AC presenting with sudden onset dizziness, nausea, vomiting came in as code stroke with LWK 11/9/19 9a when patient felt sudden onset unrelated to position dizziness and fell, not hitting his head nor losing consciousness. He endorsed associated nauseousness and 2 episodes of vomiting with the dizziness. Denied headaches, changes in vision, hearing changes, symptoms occurring upon awakening, symptoms occurring prior to today, recent travel

## 2019-11-10 NOTE — DISCHARGE NOTE PROVIDER - CARE PROVIDER_API CALL
Montserrat Fernandez (NP; RN)  NP in Family Health  [address], [address]  [place] [address]  Phone: [phone]  Fax: [phone]  Follow Up Time:     Shahriar Hernandez)  Neurology; Vascular Neurology  [address], [address]  [place] [address]  Phone: [phone]  Fax: [phone]  Follow Up Time:     Oliver Islas (DO)  Cardiovascular Disease  [address] [place] [address], [address]  [place] [address]  Phone: [phone]  Fax: [phone]  Follow Up Time: Montserrat Fernandez (NP; RN)  NP in Family Health  [address], [address]  [place] [address]  Phone: [phone]  Fax: [phone]  Follow Up Time:     Shahriar Hernandez)  Neurology; Vascular Neurology  [address], [address]  [place] [address]  Phone: [phone]  Fax: [phone]  Follow Up Time:     Oliver Islas (DO)  Cardiovascular Disease  [address] [place] [address], [address]  [place] [address]  Phone: [phone]  Fax: [phone]  Follow Up Time:

## 2019-11-10 NOTE — DISCHARGE NOTE PROVIDER - NSDCCAREPROVSEEN_GEN_ALL_CORE_FT
[facility] Stroke Floor, Team [facility] Stroke Floor, Team  Oliver Islas

## 2019-11-10 NOTE — PROGRESS NOTE ADULT - SUBJECTIVE AND OBJECTIVE BOX
CARDIOLOGY     PROGRESS  NOTE   ________________________________________________    CHIEF COMPLAINT:Patient is a 64y old  Male who presents with a chief complaint of s/p tpa (09 Nov 2019 14:54)  no complain.  	  REVIEW OF SYSTEMS:  CONSTITUTIONAL: No fever, weight loss, or fatigue  EYES: No eye pain, visual disturbances, or discharge  ENT:  No difficulty hearing, tinnitus, vertigo; No sinus or throat pain  NECK: No pain or stiffness  RESPIRATORY: No cough, wheezing, chills or hemoptysis; No Shortness of Breath  CARDIOVASCULAR: No chest pain, palpitations, passing out, dizziness, or leg swelling  GASTROINTESTINAL: No abdominal or epigastric pain. No nausea, vomiting, or hematemesis; No diarrhea or constipation. No melena or hematochezia.  GENITOURINARY: No dysuria, frequency, hematuria, or incontinence  NEUROLOGICAL: No headaches, memory loss, loss of strength, numbness, or tremors  SKIN: No itching, burning, rashes, or lesions   LYMPH Nodes: No enlarged glands  ENDOCRINE: No heat or cold intolerance; No hair loss  MUSCULOSKELETAL: No joint pain or swelling; No muscle, back, or extremity pain  PSYCHIATRIC: No depression, anxiety, mood swings, or difficulty sleeping  HEME/LYMPH: No easy bruising, or bleeding gums  ALLERGY AND IMMUNOLOGIC: No hives or eczema	    [ ] All others negative	  [ ] Unable to obtain    PHYSICAL EXAM:  T(C): 36.7 (11-10-19 @ 08:00), Max: 36.8 (11-09-19 @ 20:00)  HR: 67 (11-10-19 @ 08:43) (30 - 69)  BP: 124/81 (11-10-19 @ 08:43) (102/68 - 149/86)  RR: 18 (11-10-19 @ 08:00) (11 - 23)  SpO2: 100% (11-10-19 @ 08:43) (92% - 100%)  Wt(kg): --  I&O's Summary    09 Nov 2019 07:01  -  10 Nov 2019 07:00  --------------------------------------------------------  IN: 270 mL / OUT: 500 mL / NET: -230 mL        Appearance: Normal	  HEENT:   Normal oral mucosa, PERRL, EOMI	  Lymphatic: No lymphadenopathy  Cardiovascular: Normal S1 S2, No JVD, + murmurs, No edema  Respiratory: Lungs clear to auscultation	  Psychiatry: A & O x 3, Mood & affect appropriate  Gastrointestinal:  Soft, Non-tender, + BS	  Skin: No rashes, No ecchymoses, No cyanosis	  Neurologic: Non-focal  Extremities: Normal range of motion, No clubbing, cyanosis or edema  Vascular: Peripheral pulses palpable 2+ bilaterally    MEDICATIONS  (STANDING):  atorvastatin 80 milliGRAM(s) Oral at bedtime  atropine Injectable 0.5 milliGRAM(s) IntraMuscular once      TELEMETRY: 	    ECG:  	  RADIOLOGY:  OTHER: 	  	  LABS:	 	    CARDIAC MARKERS:  CARDIAC MARKERS ( 09 Nov 2019 11:46 )  x     / x     / x     / x     / 2.1 ng/mL                                14.1   10.34 )-----------( 224      ( 10 Nov 2019 06:39 )             42.1     11-10    139  |  103  |  13  ----------------------------<  111<H>  3.9   |  24  |  1.06    Ca    9.3      10 Nov 2019 06:39  Phos  3.5     11-09  Mg     1.9     11-09    TPro  7.6  /  Alb  4.6  /  TBili  0.5  /  DBili  x   /  AST  20  /  ALT  13  /  AlkPhos  64  11-09    proBNP:   Lipid Profile:   HgA1c:   TSH:   PT/INR - ( 09 Nov 2019 11:46 )   PT: 11.8 sec;   INR: 1.03 ratio         PTT - ( 09 Nov 2019 11:46 )  PTT:28.1 sec    < from: CT Angio Head w/ IV Cont (11.09.19 @ 12:12) >  CT BRAIN:  No acute intracranial hemorrhage, mass effect, or CT evidence   of an acute or recent subacute transcortical infarct.    CTA NECK:  No significant stenosis of the cervical carotid or vertebral   arteries.    CTA HEAD:  No significant stenosis or occlusion of the major proximal   branches of the [place].    CT PERFUSION: No core infarct is identified.    < from: 12 Lead ECG (11.09.19 @ 11:37) >  Diagnosis Line SINUS BRADYCARDIA  RIGHT BUNDLE BRANCH BLOCK  LEFT ANTERIOR FASCICULAR BLOCK  *** BIFASCICULAR BLOCK ***  VOLTAGE CRITERIA FOR LEFT VENTRICULAR HYPERTROPHY  T WAVE ABNORMALITY, CONSIDER INFEROLATERAL ISCHEMIA  ABNORMAL ECG    < from: Xray Chest 1 View- PORTABLE-Urgent (11.09.19 @ 13:06) >  IMPRESSION: Clear lungs.    < end of copied text >        Assessment and plan  ---------------------------  ? cva s/p TPA  bradycardia pt claims he had a similar episode few months ago  ?conduction disease/symptomatic bradycardia  awaiting MR  echo  lipid panel   tsh  dvt prophylaxis

## 2019-11-10 NOTE — SPEECH LANGUAGE PATHOLOGY EVALUATION - COMMENTS
In ED, patient was noted to have sinus bradycardia into 30s-40s, wife at bedside noted that patient at baseline has a low heart rate but denies it ever being as low as 30s-40s.     NIHSS 0  mRS 4    Decision made to give tPA given patient nonfatiguable nystagmus upon b/l end gaze and upward gaze and inability to walk. Patient, wife, Stroke & ED Attendings made aware of benefits and risks of tPA and in current scenario deemed benefits outweigh risks of administering tPA.   tPA bolus administered 12:19p 11/9/19.

## 2019-11-10 NOTE — DISCHARGE NOTE PROVIDER - NSDCCPCAREPLAN_GEN_ALL_CORE_FT
PRINCIPAL DISCHARGE DIAGNOSIS  Diagnosis: Transient ischemic attack  Assessment and Plan of Treatment: .Please follow up with neurologist in 1 week. Continue taking medications as prescribed. Monitor your blood pressure. Reduce fat, cholesterol and salt in your diet. Increase intake of fruits and vegetables. Limit alcohol to minimum and do not smoke. You may be at risk for falling, make changes to your home to help you walk easier. Keep up to date on vaccinations.  If you experience any symptoms of facial drooping, slurred speech, arm or leg weakness, severe headache, vision changes or any worsening symptoms, notify provider immediatley and return to ER.        SECONDARY DISCHARGE DIAGNOSES  Diagnosis: Nystagmus  Assessment and Plan of Treatment: PRINCIPAL DISCHARGE DIAGNOSIS  Diagnosis: Transient ischemic attack  Assessment and Plan of Treatment: You were treated with IV TPA  .Please follow up with neurologist in 1 week. Continue taking medications as prescribed. Monitor your blood pressure. Reduce fat, cholesterol and salt in your diet. Increase intake of fruits and vegetables. Limit alcohol to minimum and do not smoke. You may be at risk for falling, make changes to your home to help you walk easier. Keep up to date on vaccinations.  If you experience any symptoms of facial drooping, slurred speech, arm or leg weakness, severe headache, vision changes or any worsening symptoms, notify provider immediatley and return to ER.  FOLLOW UP with neurology as outpatient.        SECONDARY DISCHARGE DIAGNOSES  Diagnosis: Bradycardia  Assessment and Plan of Treatment: -> EKG shows bradycardia with significant bifascicular block  ->EP study done on 11/12/19 which was unremarkable, no acute complications  -no obvious indication for PPM given unremarkable EP study  -FOLLOW UP with cardiology, Dr. Islas in 3-4 weeks      Diagnosis: Nystagmus  Assessment and Plan of Treatment: Follow up with with ENT for evaluation  of vertigo and with neurologist 1 week after discharge.  script given for vestibular rehab.  FOLLOW UP with your primary care provider

## 2019-11-10 NOTE — DISCHARGE NOTE PROVIDER - PROVIDER TOKENS
PROVIDER:[TOKEN:[[id number]]],PROVIDER:[TOKEN:[[id number]]],PROVIDER:[TOKEN:[[id number]]]

## 2019-11-10 NOTE — DISCHARGE NOTE PROVIDER - NSFOLLOWUPCLINICS_GEN_ALL_ED_FT
[facility]  ENT  [address]  [place] [address]  Phone: [phone]  Fax:   Follow Up Time:

## 2019-11-10 NOTE — DISCHARGE NOTE PROVIDER - HOSPITAL COURSE
65yo man no significant past medical history ,not on AC presenting with sudden onset dizziness, nausea, vomiting. Pt states he felt sudden onset unrelated to position dizziness and fell, not hitting his head nor losing consciousness. Considering disabling deficit he was treated with IV TPA , Risks, benefits and adverse reactions associated with IV tPA including hemorrhagic stroke were discussed with patient. Pt had MRI brain that sows no evidence of ischemic infarct, CTA Head/Neck shows No significant stenosis or occlusion     During hospitalization, pt with bradycardia, Dr Islas (Cardiology) eval pt, ?conduction disease/symptomatic bradycardia. Pt will f/u with cardio as oupt             Impression: ? Posterior circulation TIA/ischemia        Pt was evaluated by PT/OT with recommendations for d/c home with no needs. Pt may benefit form vestibular therapy due to hx of dizziness, Pt instructed to f/u with ENT for eval of vertigo and with neurologist 1 week after discharge to discuss recent hospitalization as well as with cardiologist. Pt will be taking ASA  and high dose statin for stroke prevention. 65yo man no significant past medical history ,not on AC presenting with sudden onset dizziness, nausea, vomiting, had similar symptoms 3 months ago. Pt states he felt sudden onset unrelated to position dizziness and fell, not hitting his head nor losing consciousness. Considering disabling deficit he was treated with IV TPA , Risks, benefits and adverse reactions associated with IV tPA including hemorrhagic stroke were discussed with patient. Pt had MRI brain that sows no evidence of ischemic infarct, CTA Head/Neck shows No significant stenosis or occlusion     During hospitalization, pt with bradycardia, EKG shows bradycardia with sig bifascicular block suspect conduction disease. Dr Islas (Cardiology) eval pt. Pt will f/u with cardio as oupt             Impression: ? Posterior circulation TIA/ischemia        Pt was evaluated by PT/OT with recommendations for d/c home with no needs. Pt may benefit form vestibular therapy due to hx of dizziness, Pt instructed to f/u with ENT for eval of vertigo and with neurologist 1 week after discharge to discuss recent hospitalization as well as with cardiologist. Pt will be taking ASA  and high dose statin for stroke prevention. 65yo man no significant past medical history ,not on AC presenting with sudden onset dizziness, nausea, vomiting, had similar symptoms 3 months ago. Pt states he felt sudden onset unrelated to position dizziness and fell, not hitting his head nor losing consciousness. Considering disabling deficit he was treated with IV TPA , Risks, benefits and adverse reactions associated with IV tPA including hemorrhagic stroke were discussed with patient. Pt had MRI brain that sows no evidence of ischemic infarct, CTA Head/Neck shows No significant stenosis or occlusion     During hospitalization, pt with bradycardia, EKG shows bradycardia with sig bifascicular block suspect conduction disease. Dr Islas (Cardiology) eval pt. EP study     done on 11/12- unremarkable study. Pt will f/u with cardio as oupt    Impression: ? Posterior circulation TIA/ischemia    Pt was evaluated by PT/OT with recommendations for d/c home with no needs. Pt may benefit form vestibular therapy due to hx of dizziness, Pt instructed to f/u with ENT for eval of vertigo and with neurologist 1 week after discharge to discuss recent hospitalization as well as with cardiologist. Pt will be taking ASA  and high dose statin for stroke prevention.         Patient medically cleared for discharge on 11/12/19 by Dr. Islas.

## 2019-11-11 ENCOUNTER — TRANSCRIPTION ENCOUNTER (OUTPATIENT)
Age: 64
End: 2019-11-11

## 2019-11-11 LAB
ANION GAP SERPL CALC-SCNC: 12 MMOL/L — SIGNIFICANT CHANGE UP (ref 5–17)
BUN SERPL-MCNC: 12 MG/DL — SIGNIFICANT CHANGE UP (ref 7–23)
CALCIUM SERPL-MCNC: 8.9 MG/DL — SIGNIFICANT CHANGE UP (ref 8.4–10.5)
CHLORIDE SERPL-SCNC: 104 MMOL/L — SIGNIFICANT CHANGE UP (ref 96–108)
CO2 SERPL-SCNC: 27 MMOL/L — SIGNIFICANT CHANGE UP (ref 22–31)
CREAT SERPL-MCNC: 1.09 MG/DL — SIGNIFICANT CHANGE UP (ref 0.5–1.3)
GLUCOSE SERPL-MCNC: 110 MG/DL — HIGH (ref 70–99)
HCT VFR BLD CALC: 40.3 % — SIGNIFICANT CHANGE UP (ref 39–50)
HGB BLD-MCNC: 13.1 G/DL — SIGNIFICANT CHANGE UP (ref 13–17)
INR BLD: 1.03 RATIO — SIGNIFICANT CHANGE UP (ref 0.88–1.16)
MCHC RBC-ENTMCNC: 27.1 PG — SIGNIFICANT CHANGE UP (ref 27–34)
MCHC RBC-ENTMCNC: 32.5 GM/DL — SIGNIFICANT CHANGE UP (ref 32–36)
MCV RBC AUTO: 83.3 FL — SIGNIFICANT CHANGE UP (ref 80–100)
PLATELET # BLD AUTO: 232 K/UL — SIGNIFICANT CHANGE UP (ref 150–400)
POTASSIUM SERPL-MCNC: 3.9 MMOL/L — SIGNIFICANT CHANGE UP (ref 3.5–5.3)
POTASSIUM SERPL-SCNC: 3.9 MMOL/L — SIGNIFICANT CHANGE UP (ref 3.5–5.3)
PROTHROM AB SERPL-ACNC: 11.6 SEC — SIGNIFICANT CHANGE UP (ref 10–13.1)
RBC # BLD: 4.84 M/UL — SIGNIFICANT CHANGE UP (ref 4.2–5.8)
RBC # FLD: 13.8 % — SIGNIFICANT CHANGE UP (ref 10.3–14.5)
SODIUM SERPL-SCNC: 143 MMOL/L — SIGNIFICANT CHANGE UP (ref 135–145)
T3 SERPL-MCNC: 62 NG/DL — LOW (ref 80–200)
T4 AB SER-ACNC: 5.2 UG/DL — SIGNIFICANT CHANGE UP (ref 4.6–12)
TSH SERPL-MCNC: 2.93 UIU/ML — SIGNIFICANT CHANGE UP (ref 0.27–4.2)
WBC # BLD: 9.47 K/UL — SIGNIFICANT CHANGE UP (ref 3.8–10.5)
WBC # FLD AUTO: 9.47 K/UL — SIGNIFICANT CHANGE UP (ref 3.8–10.5)

## 2019-11-11 PROCEDURE — 93306 TTE W/DOPPLER COMPLETE: CPT | Mod: 26

## 2019-11-11 RX ORDER — ASPIRIN/CALCIUM CARB/MAGNESIUM 324 MG
81 TABLET ORAL DAILY
Refills: 0 | Status: DISCONTINUED | OUTPATIENT
Start: 2019-11-11 | End: 2019-11-12

## 2019-11-11 RX ADMIN — Medication 81 MILLIGRAM(S): at 23:19

## 2019-11-11 RX ADMIN — Medication 650 MILLIGRAM(S): at 09:30

## 2019-11-11 RX ADMIN — ATORVASTATIN CALCIUM 80 MILLIGRAM(S): 80 TABLET, FILM COATED ORAL at 23:19

## 2019-11-11 NOTE — DISCHARGE NOTE NURSING/CASE MANAGEMENT/SOCIAL WORK - PATIENT PORTAL LINK FT
You can access the FollowMyHealth Patient Portal offered by [facility] by registering at the following website: [email or web address]. By joining [facility]’s FollowMyHealth portal, you will also be able to view your health information using other applications (apps) compatible with our system.

## 2019-11-11 NOTE — DISCHARGE NOTE NURSING/CASE MANAGEMENT/SOCIAL WORK - NSDCPEEMAIL_GEN_ALL_CORE
[employer] for Tobacco Control email [email or web address]

## 2019-11-11 NOTE — DISCHARGE NOTE NURSING/CASE MANAGEMENT/SOCIAL WORK - NSDCPEWEB_GEN_ALL_CORE
[facility] for Tobacco Control website --- [email or web address] website --- [email or web address]

## 2019-11-12 VITALS
SYSTOLIC BLOOD PRESSURE: 132 MMHG | HEART RATE: 49 BPM | OXYGEN SATURATION: 98 % | TEMPERATURE: 99 F | RESPIRATION RATE: 18 BRPM

## 2019-11-12 LAB
ANION GAP SERPL CALC-SCNC: 10 MMOL/L — SIGNIFICANT CHANGE UP (ref 5–17)
APTT BLD: 31.4 SEC — SIGNIFICANT CHANGE UP (ref 27.5–36.3)
BLD GP AB SCN SERPL QL: NEGATIVE — SIGNIFICANT CHANGE UP
BUN SERPL-MCNC: 11 MG/DL — SIGNIFICANT CHANGE UP (ref 7–23)
CALCIUM SERPL-MCNC: 9.4 MG/DL — SIGNIFICANT CHANGE UP (ref 8.4–10.5)
CHLORIDE SERPL-SCNC: 102 MMOL/L — SIGNIFICANT CHANGE UP (ref 96–108)
CO2 SERPL-SCNC: 26 MMOL/L — SIGNIFICANT CHANGE UP (ref 22–31)
CREAT SERPL-MCNC: 1 MG/DL — SIGNIFICANT CHANGE UP (ref 0.5–1.3)
GLUCOSE SERPL-MCNC: 111 MG/DL — HIGH (ref 70–99)
HCT VFR BLD CALC: 41.9 % — SIGNIFICANT CHANGE UP (ref 39–50)
HGB BLD-MCNC: 13.7 G/DL — SIGNIFICANT CHANGE UP (ref 13–17)
INR BLD: 1.11 RATIO — SIGNIFICANT CHANGE UP (ref 0.88–1.16)
MAGNESIUM SERPL-MCNC: 2 MG/DL — SIGNIFICANT CHANGE UP (ref 1.6–2.6)
MCHC RBC-ENTMCNC: 27.2 PG — SIGNIFICANT CHANGE UP (ref 27–34)
MCHC RBC-ENTMCNC: 32.7 GM/DL — SIGNIFICANT CHANGE UP (ref 32–36)
MCV RBC AUTO: 83.1 FL — SIGNIFICANT CHANGE UP (ref 80–100)
PLATELET # BLD AUTO: 235 K/UL — SIGNIFICANT CHANGE UP (ref 150–400)
POTASSIUM SERPL-MCNC: 3.6 MMOL/L — SIGNIFICANT CHANGE UP (ref 3.5–5.3)
POTASSIUM SERPL-SCNC: 3.6 MMOL/L — SIGNIFICANT CHANGE UP (ref 3.5–5.3)
PROTHROM AB SERPL-ACNC: 12.6 SEC — SIGNIFICANT CHANGE UP (ref 10–13.1)
RBC # BLD: 5.04 M/UL — SIGNIFICANT CHANGE UP (ref 4.2–5.8)
RBC # FLD: 13.4 % — SIGNIFICANT CHANGE UP (ref 10.3–14.5)
RH IG SCN BLD-IMP: POSITIVE — SIGNIFICANT CHANGE UP
SODIUM SERPL-SCNC: 138 MMOL/L — SIGNIFICANT CHANGE UP (ref 135–145)
WBC # BLD: 9.58 K/UL — SIGNIFICANT CHANGE UP (ref 3.8–10.5)
WBC # FLD AUTO: 9.58 K/UL — SIGNIFICANT CHANGE UP (ref 3.8–10.5)

## 2019-11-12 PROCEDURE — 93620 COMP EP EVL R AT VEN PAC&REC: CPT

## 2019-11-12 PROCEDURE — 71045 X-RAY EXAM CHEST 1 VIEW: CPT

## 2019-11-12 PROCEDURE — 86850 RBC ANTIBODY SCREEN: CPT

## 2019-11-12 PROCEDURE — 84443 ASSAY THYROID STIM HORMONE: CPT

## 2019-11-12 PROCEDURE — C1894: CPT

## 2019-11-12 PROCEDURE — 86900 BLOOD TYPING SEROLOGIC ABO: CPT

## 2019-11-12 PROCEDURE — 83036 HEMOGLOBIN GLYCOSYLATED A1C: CPT

## 2019-11-12 PROCEDURE — 93010 ELECTROCARDIOGRAM REPORT: CPT

## 2019-11-12 PROCEDURE — 93306 TTE W/DOPPLER COMPLETE: CPT

## 2019-11-12 PROCEDURE — 97161 PT EVAL LOW COMPLEX 20 MIN: CPT

## 2019-11-12 PROCEDURE — 80048 BASIC METABOLIC PNL TOTAL CA: CPT

## 2019-11-12 PROCEDURE — 37195 THROMBOLYTIC THERAPY STROKE: CPT | Mod: XU

## 2019-11-12 PROCEDURE — 85610 PROTHROMBIN TIME: CPT

## 2019-11-12 PROCEDURE — 86901 BLOOD TYPING SEROLOGIC RH(D): CPT

## 2019-11-12 PROCEDURE — 83735 ASSAY OF MAGNESIUM: CPT

## 2019-11-12 PROCEDURE — 84100 ASSAY OF PHOSPHORUS: CPT

## 2019-11-12 PROCEDURE — 93005 ELECTROCARDIOGRAM TRACING: CPT

## 2019-11-12 PROCEDURE — C1730: CPT

## 2019-11-12 PROCEDURE — 80053 COMPREHEN METABOLIC PANEL: CPT

## 2019-11-12 PROCEDURE — 99291 CRITICAL CARE FIRST HOUR: CPT | Mod: 25

## 2019-11-12 PROCEDURE — 84484 ASSAY OF TROPONIN QUANT: CPT

## 2019-11-12 PROCEDURE — 85730 THROMBOPLASTIN TIME PARTIAL: CPT

## 2019-11-12 PROCEDURE — 80061 LIPID PANEL: CPT

## 2019-11-12 PROCEDURE — 96374 THER/PROPH/DIAG INJ IV PUSH: CPT | Mod: XU

## 2019-11-12 PROCEDURE — 84480 ASSAY TRIIODOTHYRONINE (T3): CPT

## 2019-11-12 PROCEDURE — 70498 CT ANGIOGRAPHY NECK: CPT

## 2019-11-12 PROCEDURE — 97165 OT EVAL LOW COMPLEX 30 MIN: CPT

## 2019-11-12 PROCEDURE — 82553 CREATINE MB FRACTION: CPT

## 2019-11-12 PROCEDURE — 70551 MRI BRAIN STEM W/O DYE: CPT

## 2019-11-12 PROCEDURE — 84436 ASSAY OF TOTAL THYROXINE: CPT

## 2019-11-12 PROCEDURE — 70450 CT HEAD/BRAIN W/O DYE: CPT

## 2019-11-12 PROCEDURE — 70496 CT ANGIOGRAPHY HEAD: CPT

## 2019-11-12 PROCEDURE — 86803 HEPATITIS C AB TEST: CPT

## 2019-11-12 PROCEDURE — 82962 GLUCOSE BLOOD TEST: CPT

## 2019-11-12 PROCEDURE — 85027 COMPLETE CBC AUTOMATED: CPT

## 2019-11-12 PROCEDURE — 0042T: CPT

## 2019-11-12 RX ADMIN — Medication 81 MILLIGRAM(S): at 12:34

## 2019-11-12 NOTE — CHART NOTE - NSCHARTNOTEFT_GEN_A_CORE
EP ATTENDING    Called by Dr Islas for EP study for presyncope and existing bifascicular block    In brief he is a 63 y/o male no PMH admitted with frequent presyncope. His baseline EKG shows bifascicular block. Echo is normal. Given the above he is now referred for an EP study - mainly for His bundle conduction. He denies palpitations nor angina.    PMH: as above    PShx: b/l knee surgeries    MEDICATIONS  (STANDING):  aspirin enteric coated 81 milliGRAM(s) Oral daily  atorvastatin 80 milliGRAM(s) Oral at bedtime  atropine Injectable 0.5 milliGRAM(s) IntraMuscular once    NKDA    FAMILY HISTORY:  No pertinent family history in first degree relatives  Non-contributary for premature coronary disease or sudden cardiac death    SOCIAL HISTORY:    [x ] Non-smoker  [ ] Smoker  [ ] Alcohol      REVIEW OF SYSTEMS:  [ ]chest pain  [  ]shortness of breath  [  ]palpitations  [  ]syncope  [x ]near syncope [ ]upper extremity weakness   [ ] lower extremity weakness  [  ]diplopia  [  ]altered mental status   [  ]fevers  [ ]chills [ ]nausea  [ ]vomitting  [  ]dysphagia    [ ]abdominal pain  [ ]melena  [ ]BRBPR    [  ]epistaxis  [  ]rash    [ ]lower extremity edema        [ ] All others negative	  [x ] Unable to obtain    PHYSICAL EXAM:  T(C): 36.6 (11-12-19 @ 04:13), Max: 36.8 (11-11-19 @ 20:57)  HR: 43 (11-12-19 @ 04:13) (43 - 99)  BP: 122/79 (11-12-19 @ 04:13) (122/79 - 142/82)  RR: 18 (11-12-19 @ 04:13) (18 - 18)  SpO2: 98% (11-12-19 @ 04:13) (97% - 99%)  Wt(kg): --    Appearance: Normal	  HEENT:   Normal oral mucosa, PERRL, EOMI	  Lymphatic: No lymphadenopathy , no edema  Cardiovascular: Normal S1 S2, No JVD, No murmurs , Peripheral pulses palpable 2+ bilaterally  Respiratory: Lungs clear to auscultation, normal effort 	  Gastrointestinal:  Soft, Non-tender, + BS	  Skin: No rashes, No ecchymoses, No cyanosis, warm to touch  Musculoskeletal: Normal range of motion, normal strength  Psychiatry:  Mood & affect appropriate  	  LABS:	 	                          13.1   9.47  )-----------( 232      ( 11 Nov 2019 08:06 )             40.3     11-12    138  |  102  |  11  ----------------------------<  111<H>  3.6   |  26  |  1.00    Ca    9.4      12 Nov 2019 06:04  Mg     2.0     11-12      TSH: Thyroid Stimulating Hormone, Serum: 2.93 uIU/mL (11-11 @ 22:01)      A/P) In brief he is a 63 y/o male no PMH admitted with frequent presyncope. His baseline EKG shows bifascicular block. Echo is normal. Given the above he is now referred for an EP study - mainly for His bundle conduction. He denies palpitations nor angina.    -given the above I agree with an EP study. I cited a 3% risk of bleeding or infection, and a 1% risk of major complication including but not limited to heart attack, stroke, death or need for emergency open heart surgery or pericardiocentesis. Understanding his R/B/A he elects EP study  -keep NPO for EP study today      Augie Linton M.D., RS  Cardiac Electrophysiology  [facility]  [address] [place] [address], [address] [place] [id number]  [email or web address]    office [phone]  pager [phone]

## 2019-11-12 NOTE — PROGRESS NOTE ADULT - SUBJECTIVE AND OBJECTIVE BOX
EP Brief Operative Note    Diagnosis: presyncope  Procedure: EP study  Surgeon: Augie Linton M.D.  Findings: none  EBL: minimal  Specimens: none  Post-op Diagnosis: presyncope  Assistants: none      A/P) s/p EP study which was unremarkable, no acute complications    -bed rest x 2 hours  -no obvious indication for PPM given unremarkable EP study  -consider ILR if dizziness continues  -remainder of care as per Dr Janel Linton M.D., [facility]  Cardiac Electrophysiology  [facility]  [address] [place] [address], [place] [address]  [email or web address]    office [phone]  pager [phone]

## 2019-11-12 NOTE — PROGRESS NOTE ADULT - SUBJECTIVE AND OBJECTIVE BOX
CARDIOLOGY     PROGRESS  NOTE   ________________________________________________    CHIEF COMPLAINT:Patient is a 64y old  Male who presents with a chief complaint of s/p tpa (11 Nov 2019 08:41)    	  REVIEW OF SYSTEMS:  CONSTITUTIONAL: No fever, weight loss, or fatigue  EYES: No eye pain, visual disturbances, or discharge  ENT:  No difficulty hearing, tinnitus, vertigo; No sinus or throat pain  NECK: No pain or stiffness  RESPIRATORY: No cough, wheezing, chills or hemoptysis; No Shortness of Breath  CARDIOVASCULAR: No chest pain, palpitations, passing out, dizziness, or leg swelling  GASTROINTESTINAL: No abdominal or epigastric pain. No nausea, vomiting, or hematemesis; No diarrhea or constipation. No melena or hematochezia.  GENITOURINARY: No dysuria, frequency, hematuria, or incontinence  NEUROLOGICAL: No headaches, memory loss, loss of strength, numbness, or tremors  SKIN: No itching, burning, rashes, or lesions   LYMPH Nodes: No enlarged glands  ENDOCRINE: No heat or cold intolerance; No hair loss  MUSCULOSKELETAL: No joint pain or swelling; No muscle, back, or extremity pain  PSYCHIATRIC: No depression, anxiety, mood swings, or difficulty sleeping  HEME/LYMPH: No easy bruising, or bleeding gums  ALLERGY AND IMMUNOLOGIC: No hives or eczema	    [ ] All others negative	  [ ] Unable to obtain    PHYSICAL EXAM:  T(C): 36.6 (11-12-19 @ 04:13), Max: 36.8 (11-11-19 @ 20:57)  HR: 43 (11-12-19 @ 04:13) (43 - 99)  BP: 122/79 (11-12-19 @ 04:13) (122/79 - 142/82)  RR: 18 (11-12-19 @ 04:13) (18 - 18)  SpO2: 98% (11-12-19 @ 04:13) (97% - 99%)  Wt(kg): --  I&O's Summary    11 Nov 2019 07:01  -  12 Nov 2019 07:00  --------------------------------------------------------  IN: 720 mL / OUT: 0 mL / NET: 720 mL        Appearance: Normal	  HEENT:   Normal oral mucosa, PERRL, EOMI	  Lymphatic: No lymphadenopathy  Cardiovascular: Normal S1 S2, No JVD, + murmurs, No edema  Respiratory: Lungs clear to auscultation	  Psychiatry: A & O x 3, Mood & affect appropriate  Gastrointestinal:  Soft, Non-tender, + BS	  Skin: No rashes, No ecchymoses, No cyanosis	  Neurologic: Non-focal  Extremities: Normal range of motion, No clubbing, cyanosis or edema  Vascular: Peripheral pulses palpable 2+ bilaterally    MEDICATIONS  (STANDING):  aspirin enteric coated 81 milliGRAM(s) Oral daily  atorvastatin 80 milliGRAM(s) Oral at bedtime  atropine Injectable 0.5 milliGRAM(s) IntraMuscular once      TELEMETRY: 	    ECG:  	  RADIOLOGY:  OTHER: 	  	  LABS:	 	    CARDIAC MARKERS:    < from: Transthoracic Echocardiogram (11.11.19 @ 08:26) >  Mitral Valve: Normal mitral valve.  Aortic Valve/Aorta: Normal aortic valve.  Normal aortic root size.  Left Atrium: Normal left atrium.  Left Ventricle: Normal left ventricular internal dimensions  and wall thicknesses.  Normal left ventricular systolic function. No segmental  wall motion abnormalities.  Normal diastolic function.  Right Heart: Normal right atrium. Normal right ventricular  size and function. Normal tricuspid valve. Mild tricuspid  regurgitation. Normal pulmonic valve.  Pericardium/Pleura: Normal pericardium with no pericardial  effusion.  Hemodynamic: Estiamted right atrial pressure is normal.  Pulmonary artery pressure is normal.  No PFO seen with color Doppler.  ------------------------------------------------------------------------  Conclusions:  Normal left ventricular systolic function. No segmental  wall motion abnormalities.    < end of copied text >                              13.1   9.47  )-----------( 232      ( 11 Nov 2019 08:06 )             40.3     11-12    138  |  102  |  11  ----------------------------<  111<H>  3.6   |  26  |  1.00    Ca    9.4      12 Nov 2019 06:04  Mg     2.0     11-12      proBNP:   Lipid Profile: Cholesterol 207    HDL 59  TG 97    HgA1c: Hemoglobin A1C, Whole Blood: 5.8 % (11-10 @ 09:10)    TSH: Thyroid Stimulating Hormone, Serum: 2.93 uIU/mL (11-11 @ 22:01)    PT/INR - ( 11 Nov 2019 07:53 )   PT: 11.6 sec;   INR: 1.03 ratio               Assessment and plan  ---------------------------  doing well over all  awaiting EP study, discussed with pt in length he does not want any procedure post EP study even if EP study is abnormal  no driving discussed with pt

## 2019-11-12 NOTE — PHYSICAL THERAPY INITIAL EVALUATION ADULT - PRECAUTIONS/LIMITATIONS, REHAB EVAL
CT angio head 11/9: CT BRAIN:  No acute intracranial hemorrhage, mass effect, or CT evidence of an acute or recent subacute transcortical infarct. CTA NECK:  No significant stenosis of the cervical carotid or vertebral arteries. CTA HEAD:  No significant stenosis or occlusion of the major proximal branches of the [place] of [place]. CT PERFUSION: No core infarct is identified.     Xray chest 11/9: clear lungs.  MRI brain 11/10: No acute intracranial hemorrhage or evidence of acute ischemia. Multiple small punctate nonspecific abnormal white matter foci of T2/FLAIR prolongation statistically favoring microvascular disease.

## 2019-11-12 NOTE — CHART NOTE - NSCHARTNOTEFT_GEN_A_CORE
PA Medicine Discharge Note    Patient medically cleared for discharge today by Dr. Islas.   S/p unremarkable EP study. To follow up with Dr. Islas in 3-4 weeks.  Patient presently asymptomatic, NAD with no complaints, VSS.  Discussed discharge plan with patient.      Gwen Salazar PA-C  Dept of Medicine  #[id number]

## 2019-11-12 NOTE — PHYSICAL THERAPY INITIAL EVALUATION ADULT - ADDITIONAL COMMENTS
Pt resides in an apartment with his spouse +2 + 3 steps to enter with HR. Pt was ambulating independently without use of an AD, was independent with all ADLS PTA. Pt was working full time and was very active, worked out frequently at the gym.
Pt resides in an apartment with his spouse +2 + 3 steps to enter with HR. Pt was ambulating independently without use of an AD, was independent with all ADLS PTA. Pt was working full time and was very active, worked out frequently at the gym

## 2019-11-12 NOTE — PHYSICAL THERAPY INITIAL EVALUATION ADULT - PERTINENT HX OF CURRENT PROBLEM, REHAB EVAL
Pt is 64M admitted 11/9/19 presenting with sudden onset dizziness, nausea, vomiting came in as code stroke with LWK 11/9/19 9am when patient felt sudden onset unrelated to position dizziness and fell, not hitting his head nor losing consciousness.
See Care Plan under Instructions
Pt is 64M admitted 11/9/19 presenting with sudden onset dizziness, nausea, vomiting came in as code stroke with LWK 11/9/19 9am when patient felt sudden onset unrelated to position dizziness and fell, not hitting his head nor losing consciousness.

## 2019-12-02 ENCOUNTER — APPOINTMENT (OUTPATIENT)
Dept: NEUROLOGY | Facility: CLINIC | Age: 64
End: 2019-12-02
Payer: COMMERCIAL

## 2019-12-02 ENCOUNTER — RECORD ABSTRACTING (OUTPATIENT)
Age: 64
End: 2019-12-02

## 2019-12-02 VITALS
RESPIRATION RATE: 17 BRPM | WEIGHT: 245 LBS | SYSTOLIC BLOOD PRESSURE: 125 MMHG | HEIGHT: 72 IN | OXYGEN SATURATION: 95 % | BODY MASS INDEX: 33.18 KG/M2 | HEART RATE: 87 BPM | DIASTOLIC BLOOD PRESSURE: 81 MMHG

## 2019-12-02 DIAGNOSIS — R06.83 SNORING: ICD-10-CM

## 2019-12-02 DIAGNOSIS — E78.5 HYPERLIPIDEMIA, UNSPECIFIED: ICD-10-CM

## 2019-12-02 DIAGNOSIS — I63.50 CEREBRAL INFARCTION DUE TO UNSPECIFIED OCCLUSION OR STENOSIS OF UNSPECIFIED CEREBRAL ARTERY: ICD-10-CM

## 2019-12-02 DIAGNOSIS — G47.33 OBSTRUCTIVE SLEEP APNEA (ADULT) (PEDIATRIC): ICD-10-CM

## 2019-12-02 DIAGNOSIS — R73.03 PREDIABETES.: ICD-10-CM

## 2019-12-02 PROCEDURE — 99214 OFFICE O/P EST MOD 30 MIN: CPT

## 2019-12-02 RX ORDER — ATORVASTATIN CALCIUM 80 MG/1
80 TABLET, FILM COATED ORAL
Refills: 0 | Status: ACTIVE | COMMUNITY

## 2019-12-02 RX ORDER — ASPIRIN 81 MG/1
81 TABLET, COATED ORAL
Refills: 0 | Status: ACTIVE | COMMUNITY

## 2019-12-04 NOTE — PROGRESS NOTE ADULT - SUBJECTIVE AND OBJECTIVE BOX
CARDIOLOGY     PROGRESS  NOTE   ________________________________________________    CHIEF COMPLAINT:Patient is a 64y old  Male who presents with a chief complaint of s/p tpa (10 Nov 2019 17:15)  no complain.  	  REVIEW OF SYSTEMS:  CONSTITUTIONAL: No fever, weight loss, or fatigue  EYES: No eye pain, visual disturbances, or discharge  ENT:  No difficulty hearing, tinnitus, vertigo; No sinus or throat pain  NECK: No pain or stiffness  RESPIRATORY: No cough, wheezing, chills or hemoptysis; No Shortness of Breath  CARDIOVASCULAR: No chest pain, palpitations, passing out, dizziness, or leg swelling  GASTROINTESTINAL: No abdominal or epigastric pain. No nausea, vomiting, or hematemesis; No diarrhea or constipation. No melena or hematochezia.  GENITOURINARY: No dysuria, frequency, hematuria, or incontinence  NEUROLOGICAL: No headaches, memory loss, loss of strength, numbness, or tremors  SKIN: No itching, burning, rashes, or lesions   LYMPH Nodes: No enlarged glands  ENDOCRINE: No heat or cold intolerance; No hair loss  MUSCULOSKELETAL: No joint pain or swelling; No muscle, back, or extremity pain  PSYCHIATRIC: No depression, anxiety, mood swings, or difficulty sleeping  HEME/LYMPH: No easy bruising, or bleeding gums  ALLERGY AND IMMUNOLOGIC: No hives or eczema	    [ ] All others negative	  [ ] Unable to obtain    PHYSICAL EXAM:  T(C): 37.1 (11-11-19 @ 03:57), Max: 37.1 (11-11-19 @ 03:57)  HR: 50 (11-11-19 @ 03:57) (49 - 67)  BP: 130/82 (11-11-19 @ 03:57) (106/77 - 135/100)  RR: 18 (11-11-19 @ 03:57) (15 - 25)  SpO2: 98% (11-11-19 @ 03:57) (96% - 100%)  Wt(kg): --  I&O's Summary    10 Nov 2019 07:01  -  11 Nov 2019 07:00  --------------------------------------------------------  IN: 1210 mL / OUT: 0 mL / NET: 1210 mL        Appearance: Normal	  HEENT:   Normal oral mucosa, PERRL, EOMI	  Lymphatic: No lymphadenopathy  Cardiovascular: Normal S1 S2, No JVD, + murmurs, No edema  Respiratory: Lungs clear to auscultation	  Psychiatry: A & O x 3, Mood & affect appropriate  Gastrointestinal:  Soft, Non-tender, + BS	  Skin: No rashes, No ecchymoses, No cyanosis	  Neurologic: Non-focal  Extremities: Normal range of motion, No clubbing, cyanosis or edema  Vascular: Peripheral pulses palpable 2+ bilaterally    MEDICATIONS  (STANDING):  atorvastatin 80 milliGRAM(s) Oral at bedtime  atropine Injectable 0.5 milliGRAM(s) IntraMuscular once      TELEMETRY: 	    ECG:  	  RADIOLOGY:  OTHER: 	  	  LABS:	 	    CARDIAC MARKERS:  CARDIAC MARKERS ( 09 Nov 2019 11:46 )  x     / x     / x     / x     / 2.1 ng/mL                                13.1   9.47  )-----------( 232      ( 11 Nov 2019 08:06 )             40.3     11-11    143  |  104  |  12  ----------------------------<  110<H>  3.9   |  27  |  1.09    Ca    8.9      11 Nov 2019 06:05  Phos  3.5     11-09  Mg     1.9     11-09    TPro  7.6  /  Alb  4.6  /  TBili  0.5  /  DBili  x   /  AST  20  /  ALT  13  /  AlkPhos  64  11-09    proBNP:   Lipid Profile: Cholesterol 207    HDL 59  TG 97    HgA1c: Hemoglobin A1C, Whole Blood: 5.8 % (11-10 @ 09:10)    TSH:   PT/INR - ( 11 Nov 2019 07:53 )   PT: 11.6 sec;   INR: 1.03 ratio         PTT - ( 09 Nov 2019 11:46 )  PTT:28.1 sec  < from: MR Head No Cont (11.10.19 @ 11:29) >  IMPRESSION: No acute intracranial hemorrhage or evidence of acute   ischemia.    Multiple small punctate nonspecific abnormal white matter foci of   T2/FLAIR prolongation statistically favoring microvascular disease.    < end of copied text >      Assessment and plan  ---------------------------  no evidence of cva  suspect conduction disease  echo today  will consider ep study  echo today  keep pt npo 8

## 2020-01-31 ENCOUNTER — APPOINTMENT (OUTPATIENT)
Dept: NEUROLOGY | Facility: CLINIC | Age: 65
End: 2020-01-31
Payer: COMMERCIAL

## 2020-01-31 PROCEDURE — 95806 SLEEP STUDY UNATT&RESP EFFT: CPT

## 2020-03-19 ENCOUNTER — APPOINTMENT (OUTPATIENT)
Dept: NEUROLOGY | Facility: CLINIC | Age: 65
End: 2020-03-19

## 2020-06-25 NOTE — ED ADULT NURSE NOTE - NEURO ASSESSMENT
Called pt to go over covid screening and get pre-registered. Left message to return writer's call.    - - -

## 2021-01-15 NOTE — PATIENT PROFILE ADULT - NSPROPASSIVESMOKEEXPOSURE_GEN_A_NUR
EXAMINATION: Chest 1 view



HISTORY: COVID positive. Followup.



COMPARISON: 01/14/2021.



FINDINGS: Stable right PICC.



Unchanged patchy opacities are seen throughout the lungs. No

large pleural effusion or pneumothorax. Stable cardiac

silhouette.



IMPRESSION: 



1. Stable chest with stable patchy opacities throughout the

lungs.

2. Stable right PICC.



Dictated by: 



  Dictated on workstation # [id number] No

## 2021-01-19 NOTE — DISCHARGE NOTE NURSING/CASE MANAGEMENT/SOCIAL WORK - NSFLUVACAGEDISCH_IMM_ALL_CORE
A pleuroparenchymal nodular density seen at the right lower lobe in its superior aspect in image 111 series 603 and image 91 series 2 probable atelectasis    Consider follow-up chest CT at 3 months
Adult

## 2021-03-04 ENCOUNTER — APPOINTMENT (OUTPATIENT)
Dept: OPHTHALMOLOGY | Facility: CLINIC | Age: 66
End: 2021-03-04
Payer: COMMERCIAL

## 2021-03-04 ENCOUNTER — NON-APPOINTMENT (OUTPATIENT)
Age: 66
End: 2021-03-04

## 2021-03-04 PROCEDURE — 92004 COMPRE OPH EXAM NEW PT 1/>: CPT

## 2021-03-04 PROCEDURE — 99072 ADDL SUPL MATRL&STAF TM PHE: CPT

## 2021-10-29 ENCOUNTER — APPOINTMENT (OUTPATIENT)
Dept: ORTHOPEDIC SURGERY | Facility: CLINIC | Age: 66
End: 2021-10-29

## 2021-11-04 ENCOUNTER — APPOINTMENT (OUTPATIENT)
Dept: ORTHOPEDIC SURGERY | Facility: CLINIC | Age: 66
End: 2021-11-04

## 2021-11-29 ENCOUNTER — APPOINTMENT (OUTPATIENT)
Dept: OPHTHALMOLOGY | Facility: CLINIC | Age: 66
End: 2021-11-29

## 2023-12-30 NOTE — DISCHARGE NOTE PROVIDER - CARE PROVIDERS DIRECT ADDRESSES
,DirectAddress_Unknown,[email or web address],DirectAddress_Unknown
(1) Oriented to own ability

## 2024-02-07 NOTE — ED ADULT NURSE NOTE - NIH STROKE SCALE: 2. BEST GAZE, QM
Leave incision open to air. Dermabond glue will flake off over the next 2-3 weeks.   OK to shower & pat dry starting 2/8/24  No soaking in hot tubs, baths, pools, etc.  Avoid repetitive bending, lifting over 10lbs, twisting. We encourage you to take frequent walks as tolerated  Do not drive or consume alcohol while taking narcotic pain medications. Do not take additional acetaminophen (tylenol) without consulting our office.   Do not take NSAIDs (such as ibuprofen or naproxen) or Aspirin unless you have been told otherwise by Dr Smith's team  Follow up at [facility] office in 2 weeks. Call with questions or concerns @ [phone]  
(0) Normal

## 2024-04-29 ENCOUNTER — APPOINTMENT (OUTPATIENT)
Dept: OPHTHALMOLOGY | Facility: CLINIC | Age: 69
End: 2024-04-29
Payer: MEDICARE

## 2024-04-29 ENCOUNTER — NON-APPOINTMENT (OUTPATIENT)
Age: 69
End: 2024-04-29

## 2024-04-29 PROCEDURE — 92004 COMPRE OPH EXAM NEW PT 1/>: CPT

## 2024-05-13 ENCOUNTER — TRANSCRIPTION ENCOUNTER (OUTPATIENT)
Age: 69
End: 2024-05-13

## 2024-05-20 ENCOUNTER — APPOINTMENT (OUTPATIENT)
Dept: ORTHOPEDIC SURGERY | Facility: CLINIC | Age: 69
End: 2024-05-20
Payer: MEDICARE

## 2024-05-20 DIAGNOSIS — M17.11 UNILATERAL PRIMARY OSTEOARTHRITIS, RIGHT KNEE: ICD-10-CM

## 2024-05-20 PROCEDURE — 20610 DRAIN/INJ JOINT/BURSA W/O US: CPT | Mod: RT

## 2024-05-20 PROCEDURE — 73564 X-RAY EXAM KNEE 4 OR MORE: CPT | Mod: RT

## 2024-05-20 PROCEDURE — 99204 OFFICE O/P NEW MOD 45 MIN: CPT | Mod: 25

## 2024-05-20 NOTE — DISCUSSION/SUMMARY
[de-identified] : 70 y/o male with right knee OA.   Patient presents for additional evaluation of knee pain. Patient presents with worsening degree of arthrosis, but symptoms are relatively mild. I again discussed the treatment of degenerative arthritis with the patient at length today and likely future progression of the disease with intermittent periods of joint inflammatory exacerbation. Nonoperative treatment modalities include; brace use, weight reduction, activity modification/restriction, low impact exercise, PRN use of acetaminophen or anti-inflammatory medication (if able), natural anti-inflammatory supplements, glucosamine/chondroitin, and HEP/physical therapy (for strengthening and conditioning). Cortisone injection can be performed for periods of acute exacerbation and HA injections may be appropriate for longer-term pain management. Definitive treatment may include consideration of total joint arthroplasty for persistent symptoms refractory to conservative care.   Patient voiced understanding of treatment strategies and short-term vs. long-term prognosis.   Aspiration & Injection therapy was provided for therapeutic and symptomatic relief.  Recommendations: Continued symptommatic management and conservative care as outlined.   Follow up as needed.

## 2024-05-20 NOTE — ADDENDUM
[FreeTextEntry1] : This note was written by Ruthann David on 05/20/2024 acting solely as a scribe for Dr. Raúl Bhatia.  All medical record entries made by the Scribe were at my, Dr. Raúl Bhatia, direction and personally dictated by me on 05/20/2024. I have personally reviewed the chart and agree that the record accurately reflects my personal performance of the history, physical exam, assessment and plan.

## 2024-05-20 NOTE — HISTORY OF PRESENT ILLNESS
[de-identified] : 69 year old male presents today with right knee pain x 2 months. No injury reported. The pain is presents with prolonged sitting and getting out of car. Tylenol/Advil is helpful. C/o occasional buckling. Denies catching, locking, numbness or tingling.  S/P  bilateral Patella tendon report in the 80s. Patient is a . Patient was seen in January 2019 for OA and was given an injection which provided temporary relief.

## 2024-05-20 NOTE — PROCEDURE
[de-identified] : Aspiration & Injection: right knee joint.  Indication: OA/effusion.   A discussion was had with the patient regarding this procedure and all questions were answered. All risks, benefits and alternatives were discussed. These included but were not limited to bleeding, infection, allergic reaction and reaccumulation of fluid. Alcohol was used to clean the skin, and betadine was used to sterilize and prep the area in the supero-lateral aspect of the right knee. Ethyl chloride spray was then used as a topical anesthetic. An 18-gauge needle was used to aspirate the knee joint and approximately 56 cc of inflammatory fluid was aspirated from the right knee without complication. In addition, following the aspiration, an injection of 4cc of 1% lidocaine and 1cc of 40mg/ml methylprednisolone also inserted into the knee via the same needle. A sterile bandage was then applied. The patient tolerated the procedure well.

## 2024-05-20 NOTE — PHYSICAL EXAM
[de-identified] : Right knee exam  Skin: Clean, dry, intact Inspection: No obvious malalignment, no masses, + swelling, +large effusion Pulses: 2+ DP/PT pulses ROM: 5-90 degrees of flexion. +pain with deep knee flexion/extension. Tenderness: No MJLT. No LJLT. No pain over the patella facets. No pain to the quadriceps tendon. No pain to the patella tendon. No posterior knee tenderness. Stability: Stable to varus, valgus. Negative lachman testing. Negative anterior drawer, negative posterior drawer. Strength: 5/5 Q/H/TA/GS/EHL, without atrophy Neuro: In tact to light touch throughout, DTR's normal Additional tests: Negative McMurrays test, Negative patellar grind test. [de-identified] : The following radiographs were ordered and read by me during this patients visit. I reviewed each radiograph in detail with the patient and discussed the findings as highlighted below.   4 views of the right knee were obtained today, 05/20/2024, that show no acute fracture or dislocation. There is moderate medial, moderate lateral and moderate patellofemoral degenerative changes seen. There is no significant malalignment. High riding patella

## 2025-06-08 NOTE — DISCHARGE NOTE NURSING/CASE MANAGEMENT/SOCIAL WORK - NSDCPEPTSTRK_GEN_ALL_CORE
Call 911 for stroke/Stroke support groups for patients, families, and friends/Stroke warning signs and symptoms/Need for follow up after discharge/Prescribed medications/Risk factors for stroke/Stroke education booklet/Signs and symptoms of stroke no

## 2025-06-23 ENCOUNTER — NON-APPOINTMENT (OUTPATIENT)
Age: 70
End: 2025-06-23

## 2025-06-23 ENCOUNTER — APPOINTMENT (OUTPATIENT)
Dept: OPHTHALMOLOGY | Facility: CLINIC | Age: 70
End: 2025-06-23
Payer: MEDICARE

## 2025-06-23 PROCEDURE — 92014 COMPRE OPH EXAM EST PT 1/>: CPT
